# Patient Record
Sex: MALE | Race: WHITE | URBAN - METROPOLITAN AREA
[De-identification: names, ages, dates, MRNs, and addresses within clinical notes are randomized per-mention and may not be internally consistent; named-entity substitution may affect disease eponyms.]

---

## 2018-06-01 ENCOUNTER — HOSPITAL ENCOUNTER (OUTPATIENT)
Dept: OTHER | Age: 58
Discharge: HOME | End: 2018-06-30
Attending: THORACIC SURGERY (CARDIOTHORACIC VASCULAR SURGERY) | Admitting: THORACIC SURGERY (CARDIOTHORACIC VASCULAR SURGERY)

## 2018-06-01 RX ORDER — VITAMIN B COMPLEX
TABLET ORAL
COMMUNITY

## 2018-06-01 RX ORDER — GABAPENTIN 100 MG/1
200 CAPSULE ORAL
COMMUNITY
Start: 2018-05-22 | End: 2018-06-21

## 2018-06-01 RX ORDER — ATORVASTATIN CALCIUM 40 MG/1
40 TABLET, FILM COATED ORAL
COMMUNITY
Start: 2018-05-08

## 2018-06-01 RX ORDER — TAMSULOSIN HYDROCHLORIDE 0.4 MG/1
CAPSULE ORAL
COMMUNITY
Start: 2018-05-14

## 2018-06-01 RX ORDER — LANSOPRAZOLE 15 MG/1
CAPSULE, DELAYED RELEASE ORAL
COMMUNITY

## 2018-06-01 RX ORDER — ASPIRIN 81 MG/1
81 TABLET ORAL
COMMUNITY

## 2018-06-29 ENCOUNTER — HOSPITAL ENCOUNTER (OUTPATIENT)
Dept: CARDIAC REHAB | Age: 58
Discharge: HOME | End: 2018-07-01

## 2018-07-01 ENCOUNTER — HOSPITAL ENCOUNTER (OUTPATIENT)
Dept: OTHER | Age: 58
Discharge: HOME | End: 2018-07-01
Attending: THORACIC SURGERY (CARDIOTHORACIC VASCULAR SURGERY) | Admitting: THORACIC SURGERY (CARDIOTHORACIC VASCULAR SURGERY)

## 2018-07-09 NOTE — PROGRESS NOTES
[address] [facility]  Phase 2 Cardiac Rehabilitation  Individualized Cardiac Treatment Plan    Patient Name:  Yann Fonseca :  1960 Age:  59 y.o. Account Number:  [de-identified]  Diagnosis: CABG  Date of Event: 2018  Physician:   DR Mansi Bailey Office Visit:  2018   Risk Stratifications: ()Low (x)Intermediate ()High  Allergies: No Known Allergies  Phase 1: No      Primary Diagnosis  Cad,CABG 2018    Cardiac History: 59 yr old male with increased cholesterol, family hx  and former smoker was concerned when had two friends pass away from heart disease. Pt went to see physician and calcium score was high and a new changenoted on his EKG which led to heart catheterization. Two vessel CABG was performed with LIMA to the LAD and SVG to the RCA on 2018. He presents today for cardiac rehabilitation          []  MI              [x]  CABG         []  PTCA            []  Valve Replacement    []  Arrhythmia    []  CHF   Last Admission:   [] Pacemaker        []  ICD   []  Other     Ejection fraction: 55%          Physical Assessment    General Appearance   Color: [x]  Natural [] Pale ( ) Cyanotic []  Flushed [] Jaundice   Skin Integrity: Intact Incision(s) where: midsternal incision.   Hx of infection at incision(s) site [] No  [x] Yes chest tube site     Cardiovascular Assessment/ Vital Signs    Heart rate: RRR Heart sounds:   Height: 70.5  Weight: 197.2    Resp rate: 12 Lungs sounds: clear     Dyspnea  [x]  no  [] yes       []  Sleep Apnea    []  CPAP     []  Oxygen       Sleeping Habits:  6- 7 at night    # of Pillows: 1   # of times up at night: 2    BP  R arm sittin/60   L arm sittin/60    Peripheral pulses  []  no [x] yes    Edema [x] no [] yes  Location:      Fatigue  [x] no  [] yes    Numbness/tingling []  no   [x]  yes    midsternal incision    Orthopedic/Exercise Limitations     Pain Assessment   Rate on 1 to 10 scale      []  No Plan                Mode       1. TM at  3 mph for 20  min at   3 mets  2. NS at  2  for  10 min at  0 mets  3. UBE on hold for now. Sternal precautions   Mode      TM  NS  Ellipt/Arc  Bike  UBE  Scifit     Mode      TM  NS  Ellipt/Arc  Bike  UBE  Scifit     Mode      TM  NS  Ellipt/Arc  Bike  UBE  Scifit   Continue independent exercise [] Y    Continue in Phase IV [] Y    Aerobic Mode:     Frequency: 2-3  Week  Duration: 15-30 min  Intensity:  RPE 11-12     Frequency:   3 x week   Duration: 20-30 min  Intensity:   THR ___or RPE 11-13     Frequency: 3 x week  Duration: 20-40 min. Intensity:   THR ___ or RPE 11-14     Frequency: 3 x week  Duration: 30-45 min  Intensity:  THR ___or RPE 11-14 Frequency:      Exercise 3-5 days per week. [] yes[] no  []   30+ min. Of exercise per session    [] yes [] no     Progression:  Depending on patient condition, time and intensity will be increased. An initial met level< 3 is classified as \"light\". Progression to \"moderate\" 3-6 mets or higher for patients in better physical condition. Resistance Training  [x] yes  [] no         Max. Met level:    Session #:    Resistance Training  [] yes  [] no  Type:    Symptoms with Exercise[] yes [] no Max. Met Level:    Session#:    Resistance Training  [] yes [] no  Type:    Symptoms with Exercise[] yes[] no   Max. Met. Level:    Session #:    Resistance Training  [] yes [] no  Type:    Symptoms with Exercise [] yes [] no Max. Met level:    Sessions#:    Home Resistance Training [] Y[] N  Type:   Home Exercise Plan and Goals  Pavel plans:  Exercise (x)yes ()no  Frequency:daily   Duration:30-45 minutes   Mode: walking    Discharge Goal:  30+ min.  Of aerobic exercise 3-5 days/week       Home Exercise Goal  Reassessed  Exercising [] yes[] no  Frequency:  Duration:  Mode:         Home Exercise Goal Reassessed  Exercising [] yes [] no  Frequency:  Duration:  Mode:       Home Exercise Goal Reassessed  Exercising [] yes [] no  Frequency:  Duration:  Mode:           See above plan   Education Plan Education Plan Education Plan Education Plan Education Completed   Orientation to:  [x] Y [] N Rehab/Routine  [x] Y[] N RPE Scale  [x] Y [] N Exercise Safety  [x] Y [] N   S/S to Report  [x] ()Y [] N Infection Control      Understand/Review  [] Y [] N RPE Scale  [] Y [] N Exercise Safety  [] Y [] N Equipment Orientation  [] Y [] N S/S to Report  [] Y [] N Warm Up/Cool Down      Attends Ed Class:  []  Benefits of Exercise   []   Resistance Training 101  []   Benefits of Cardiac Rehab    [] Patient is competent with stretches/equipment  []  Patient continues to need assist with equipment/routine        Attends Ed. Class  []  Benefits of Exercise  []   Resistance Training 101  []  Benefits of Cardiac Rehab                                Attends Ed. Class:  []   Benefits of Exercise   []  Resistance Training 101  []   Benefits of Cardiac Rehab    []  Y  Knows when not to exercise  []  Y Completed all 3  Education classes       Individual Cardiac Treatment Plan  Nutrition  NUTRITION  ASSESSMENT/PLAN NUTRITION  REASSESSMENT NUTRITION   REASSESSMENT NUTRITION   REASSESSMENT NUTRITION  DISCHARGE/FOLLOW-UP   NUTRITION ASSESSMENT NUTRITION REASSESSMENT NUTRITION REASSESSMENT NUTRITION   REASSESSMENT NUTRITION REASSESSMENT   Weight Management  Weight: 197.2 Height:70.5   BMI: 27.8%   [x] Normal  [] Overweight ()Obese    [] Recent gain:    [x] Recent loss: weighed 230 lbs.  Prior to surgery  Patients goal weight:    200 lbs Weight Management  Weight:                         Weight Management  Weight:                       Weight   Management  Weight:         Weight Management  Weight:                  Height:    BMI:    Diet  Current Diet: cardiac      Diet  Dietary Improvements:   Diabetes:   [] Y  [x] N  FBS  HgA1 C : NA/date  Checks BS at home   [] Y  [x] N  Frequency  NA  Range NA  Medications NA  Low BS Reaction NA   []  To check BS first 6 and Mediterranean Diet           Education Classes by Dietician  1. [] Nutrients & Portion Control  2. [] Fat & Fibers  3. [] Sodium, Dash and Mediterranean Diet   Patient has knowledge of:   [] Y  [] N Heart Healthy diet   [] Y [] N Nutritional guidelines    [] Y  [] N Diabetic diet      Goals Goals Reassessed Goals Reassessed Goals Reassessed Goals   Initial Nutritional Goals Set (x)Yes  ()No Previous Nutritional Goals Met?  ()Yes  ()No Previous Nutritional Goals Met?  ()Yes  ()No Previous Nutritional Goals Met?  ()Yes  ()No Previous Nutritional Goals Met?  ()Yes  ()No   Pavel's nutritional goals are as follows: Individual goals :    1. Increase knowledge of cardiac diet. Long term:  1. Improved Rate your plate score  2. Improved glucose control Review goals/ Revised:             Review goals/Revised:             Review goals/Revised:                   Long Term:  1. Improved Rate your plate score  [] yes  2. Improved glucose control  [] yes   Individual Cardiac Treatment Plan  Psychosocial  PSYCHOSOCIAL  ASSESSMENT/PLAN PSYCHOSOCIAL  REASSESSMENT PSYCHOSOCIAL   REASSESSMENT PSYCHOSOCIAL   REASSESSMENT PSYCHOSOCIAL  DISCHARGE/FOLLOW-UP   PSYCHOSOCIAL ASSESSMENT PSYCHOSOCIAL REASSESSMENT PSYCHOSOCIAL REASSESSMENT PSYCHOSOCIAL REASSESSMENT PSYCHOSOCIAL REASSESSMENT   Behavioral Outcomes Behavioral Outcomes Behavioral Outcomes Behavioral Outcomes Behavioral Outcomes   Tool Used: Angelo and Lui Score:  Overall score : 28.01  Psych/Spiritial :28.93     PHQ-9 score 4  Score 10 or > signifies moderate or > depression. Depression:  Overall score  Psych/Spiritial     PHQ-9 score:    Does patient have Family Support?    [x] Yes   [] No   [] Lives alone [x]  Lives with spouse       PSYCHOSOCIAL PLAN PSYCHOSOCIAL PLAN PSYCHOSOCIAL PLAN PSYCHOSOCIAL PLAN  PSYCHOSOCIAL PLAN   Interventions Interventions Interventions Interventions Interventions    [] Physician notified of PHQ-9 score of 10 or > per protocol, as signifies moderate or > depression           PHQ-9 score:    []  Improvement   []  Unchanged   []  Notify PCP if score is worse   Is patient currently taking anti-depressant or anti-anxiety medications? [] Yes   [x] No  Current depression tx:None  Current depression tx:   []  N/A Current depression tx   [] N/A: Current depression tx:   []  N/A  []  Patient has plan/treatment for anxiety/depression. Education Education Education Education Education   Individual discussion/education of:   [x] Stress management skills   [x] Relaxation Techniques   [x] Coping Techniques Check if completed:   [] Attends Emotional Wellness class  ()Voices method of coping/ relaxation technique   Check if completed:   [] Attends Emotional wellness class   [] Voices method of coping/ relaxation technique   Check if completed:   [] Attends Emotional wellness class   [] Voices method of coping/ relaxation technique      Goals    Goals*   Initial Psychosocial Goals Set [] Yes   [x] No    Previous Psychosocial Goals Met  [] Yes   [] No   Pavel's psychosocial goals are as follows:  1. Get back to weight lifting. 2. Return to work    3.  Return to golf    Improved PHQ9 score  Improved Mental Health  Score               [] Improved PHQ9 score   [] Improved Mental Health score     Individual Cardiac Treatment Plan  Other:  Risk Factor/Education  CORE MEASURE  ASSESSMENT/PLAN CORE MEASURE  REASSESSMENT  CORE MEASURE  REASSESSMENT CORE MEASURE  REASSESSMENT CORE MEASURE  DISCHARGE/FOLLOW-UP   [address]  Discharge   Hypertension   []Yes [x]No  Resting BP: 120/62  Peak Ex BP:130/60   See medication list.   Hypertension    Resting BP:   Peak Ex BP:  Medication Changes:  []Yes []No   Hypertension    Resting BP:   Peak Ex BP:  Medication Changes:  []Yes []No     Hypertension    Resting BP:   Peak Ex BP:  Medication Changes:  []Yes []No Hypertension    Resting BP:   Peak Ex BP:  Medication Changes:  []Yes []No     Tobacco Use  []Current [x]Former []Never  4/2018  Years smoked:     Date Quit: 4/2018  Quit date set: []Yes []No  Date: 4/2018  # cigarettes smoked/day: 3-4 cigarettes/ day  Smokeless Tobacco use:   []Yes  [x]No  Amount:  Tobacco Use  Change in smoking status           Quit date:    Tobacco Use  Change in smoking status           Quit date:    Tobacco Use  Change in smoking status           Quit date:     Tobacco Use  Change in smoking status           Quit date:      Heart Health Knowledge   Test Score: 15 /15        Heart Health Knowledge   Test Score:    /15        Learning Barriers  Please select one:  []Speech  []Literacy  [] Hearing  []Cognitive  [] Vision  [x]Ready to Learn Learning Barriers addressed:[] Y[] N  Modifications: Other Core Measures EDUCATION PLAN Other Core Measures EDUCATION PLAN Other Core Measures EDUCATION PLAN Other Core Measures EDUCATION PLAN Other Core Measure EDUCATION PLAN   Interventions Interventions Interventions Interventions Interventions   Cardiac Risk Factor Education Needed      []HTN              [] Attended Education Class on Risk Factors for Heart Disease  [] Home B/P monitoring  [] Dietary Sodium Restriction      []Attended Education Class on Risk Factors for Heart Disease  [] Home B/P monitoring  [] Dietary Sodium Restriction          []Attended Education Class on Risk Factors for Heart Disease    []Home B/P monitoring  [] Dietary Sodium Restriction  Pavel voices 1. Understanding of risks for heart disease and how to modify their risk. 2. Understanding of importance of restricting sodium in diet.     []Y []N  Smoking Cessation counseling needed  []Y []N Pt verbalizes readiness to quit smoking?  []Y[] () N Quit date set  []Y []N Cut down cigarettes   []One on one counseling on Tobacco Cessation  [] Attend Smoking Cessation classes    []Smoking Cessation Information given  []Smoking cessation medications used:   [] One on one counseling on Tobacco Cessation. [] Attend smoking cessation classes      []Smoking cessation medications used:   [] One on one counseling on Tobacco Cessation. [] Attend smoking cessation classes        []Smoking cessation medications used: Tobacco Cessation Counseling attended  []Yes  []No  If not completed, Why? Core Measure Education Core Measure Education Core Measure Education Core Measure Education Education   [x] Cardiac Rehab Education booklet given  [x] Cardiac Rehab education class list given Attended Education Classes:  1. []  A & P of the  Heart & Body  2. []Heart Disease  3. [] Risk Factors  4. []  Cardiac Medications  5. [] Cardiac Interventions Attended Education Classes:  1. []  A & P of the  Heart & Body  2. [] Heart Disease  3. []  Risk Factors  4.[] ()  Cardiac Medications  5. [] Cardiac Interventions Attended Education Classes:  1. [] A & P of the  Heart & Body  2. []Heart Disease  3. [] Risk Factors  4. [] Cardiac Medications  5. []Cardiac Interventions Attend all the education classes. *Goals* Goal Reassessment Goal Reassessment Goal Reassessment *Goals*   Carlos Alberto Initial Risk factor/education  goals are as follows:  1. Increase knowledge of cardiac diet. 2. Exercise safety- Not doing too much too soon      Resting B/P < 140/90 or 130/80 if DM or CKD  [] Y []N Complete tobacco cessation  []Y []N Understanding risks of heart disease  []Y []N Heart failure self management     Goal Progress:    Goal Progress:     Goal Progress: Tor Santos has met goals ()yes         Physician Response  [x]Initiate Individualized Treatment Plan (ITP)  []Other   Physician Response  [] Proceed with rehab and 30 day updates per ITP. []Other     Physician Response  [] Proceed with rehab and 30 day updates per ITP. []Other   Physician Response  [] Proceed with rehab and 30 day updates per ITP.    []Other    Physician Final Signature     Comments:  Mr. Melissa Lama walked 1900 ft for his six minute walk at 3.6 mph and 3.8 mets. Monitor revealed SB/SR and appropriate HR and BP response. Pt is anxious to get back to regular activities. Pt is a heavy weight . Pt cautioned to not lift weights till his sternal precautions have been lifted and he has been cleared by his cardiologist..  Discussed the risks of doing heavy lifting before sternum has healed. Pt verbalizes understanding and agrees to attend rehab 3 times a week for exercise. Pt is open to risk factor modification. Will initiate ITP. Patrice DELACRUZN, MED    Discharge Comments: Mr. Sherin Hills attended one session of his monitored cardiac rehab when he decided he would rather attempt his rehabilitation at home, preferably because he would like to resume power lifting as soon as possible. Staff cautioned against such activity given he still remained on sternal precautions at the time. Staff encouraged patient to continue with cardiac rehab for at least 4 weeks for observation, monitoring and support, but he declined. Thank you. LORRAINE Briceno MS, RDN, LD, RN     [de-identified] Signature: _________________ Date:________